# Patient Record
Sex: FEMALE | Race: WHITE | Employment: FULL TIME | URBAN - NONMETROPOLITAN AREA
[De-identification: names, ages, dates, MRNs, and addresses within clinical notes are randomized per-mention and may not be internally consistent; named-entity substitution may affect disease eponyms.]

---

## 2020-09-17 ENCOUNTER — TELEPHONE (OUTPATIENT)
Dept: SURGERY | Age: 65
End: 2020-09-17

## 2020-09-17 RX ORDER — B-COMPLEX WITH VITAMIN C
TABLET ORAL DAILY
COMMUNITY

## 2020-09-18 RX ORDER — POLYETHYLENE GLYCOL 3350, SODIUM CHLORIDE, SODIUM BICARBONATE, POTASSIUM CHLORIDE 420; 11.2; 5.72; 1.48 G/4L; G/4L; G/4L; G/4L
POWDER, FOR SOLUTION ORAL
Qty: 1 BOTTLE | Refills: 0 | Status: SHIPPED | OUTPATIENT
Start: 2020-09-18

## 2020-09-18 RX ORDER — BISACODYL 5 MG
TABLET, DELAYED RELEASE (ENTERIC COATED) ORAL
Qty: 2 TABLET | Refills: 0 | Status: SHIPPED | OUTPATIENT
Start: 2020-09-18

## 2020-09-18 NOTE — TELEPHONE ENCOUNTER
Received a call from  Aleksandr Bergeron. He does not want to mail paperwork back to schedule his wife's colonoscopy. He wants to schedule over the phone. I called Tai. Scheduled Rodney Gandhi for colonoscopy at [facility] on 10/2/20. Instructions mailed to patient. Nulytely and dulcolax tabs ordered at [facility] in [place], verbal order of Dr. Phylicia Martines.

## 2020-09-18 NOTE — TELEPHONE ENCOUNTER
H &P Colonoscopy  Patient:Alba Conley                 :1955  (no) patient has seen Dr. Sophy Chand prior to procedure  PCP: Dr. Peng Jacobson    CC:Colon cancer screening        HPI:        Colonoscopy  Abd pain: no  Anemia: no  Bloating:no  Diarrhea: no  Constipation: no  Melena: no  Hematochezia:no  Rectal Bleeding:no  Rectal/Anal Pain:no  Pruritus: no  Family history colon Cancer: no  Previous colon cancer: no  Previous Colon Polyp: no  Change in bowels: no  Decrease caliber of stool: no  Change in color of stool: no    Previous work up date: approx 12 years ago at [facility], normal per patient.        Family History   Problem Relation Age of Onset    Glaucoma Neg Hx      Social History     Socioeconomic History    Marital status:      Spouse name: Not on file    Number of children: Not on file    Years of education: Not on file    Highest education level: Not on file   Occupational History    Not on file   Social Needs    Financial resource strain: Not on file    Food insecurity     Worry: Not on file     Inability: Not on file    Transportation needs     Medical: Not on file     Non-medical: Not on file   Tobacco Use    Smoking status: Current Every Day Smoker   Substance and Sexual Activity    Alcohol use: Not on file    Drug use: Not on file    Sexual activity: Not on file   Lifestyle    Physical activity     Days per week: Not on file     Minutes per session: Not on file    Stress: Not on file   Relationships    Social connections     Talks on phone: Not on file     Gets together: Not on file     Attends Taoism service: Not on file     Active member of club or organization: Not on file     Attends meetings of clubs or organizations: Not on file     Relationship status: Not on file    Intimate partner violence     Fear of current or ex partner: Not on file     Emotionally abused: Not on file     Physically abused: Not on file     Forced sexual activity: Not on file   Other Topics Concern  Not on file   Social History Narrative    Not on file     Past Surgical History:   Procedure Laterality Date    APPENDECTOMY      COLONOSCOPY      HYSTERECTOMY, TOTAL ABDOMINAL      OTHER SURGICAL HISTORY  08/2020    removal of pituitary gland    TOTAL KNEE ARTHROPLASTY Left 2017    VARICOSE VEIN SURGERY Left      Past Medical History:   Diagnosis Date    Heart murmur      Current Outpatient Medications on File Prior to Visit   Medication Sig Dispense Refill    Ascorbic Acid (VITAMIN C GUMMIES PO) Take by mouth Takes 2 daily      B Complex Vitamins (VITAMIN B COMPLEX) TABS Take by mouth daily Take one every other day. No current facility-administered medications on file prior to visit.       Allergies as of 09/17/2020    (No Known Allergies)           PEx:                ASSESSMENT:        PLAN:Colonoscopy

## 2020-09-25 NOTE — TELEPHONE ENCOUNTER
Received call from Jeanne Claros  wanting to reschedule her colonoscopy at [facility] that was scheduled on 10/2/2020. Sadie Brock explained she has an MRI scheduled on 10/3/2020 and they will not do the MRI if she has a colonoscopy within 2 week prior. Patient rescheduled for 10/9/2020 at [facility]. Instructed him she will need to complete her bowel prep on 10/8/2020.